# Patient Record
Sex: MALE
[De-identification: names, ages, dates, MRNs, and addresses within clinical notes are randomized per-mention and may not be internally consistent; named-entity substitution may affect disease eponyms.]

---
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
## 2019-01-25 ENCOUNTER — APPOINTMENT (OUTPATIENT)
Dept: PEDIATRICS | Facility: CLINIC | Age: 19
End: 2019-01-25

## 2019-05-01 ENCOUNTER — APPOINTMENT (OUTPATIENT)
Dept: PEDIATRICS | Facility: CLINIC | Age: 19
End: 2019-05-01

## 2019-07-15 ENCOUNTER — APPOINTMENT (OUTPATIENT)
Dept: PEDIATRICS | Facility: CLINIC | Age: 19
End: 2019-07-15
Payer: COMMERCIAL

## 2019-07-15 VITALS — WEIGHT: 195.8 LBS | BODY MASS INDEX: 23.36 KG/M2 | HEART RATE: 69 BPM | HEIGHT: 76.8 IN

## 2019-07-15 DIAGNOSIS — Z00.00 ENCOUNTER FOR GENERAL ADULT MEDICAL EXAMINATION W/OUT ABNORMAL FINDINGS: ICD-10-CM

## 2019-07-15 DIAGNOSIS — L70.0 ACNE VULGARIS: ICD-10-CM

## 2019-07-15 DIAGNOSIS — Z87.2 PERSONAL HISTORY OF DISEASES OF THE SKIN AND SUBCUTANEOUS TISSUE: ICD-10-CM

## 2019-07-15 PROCEDURE — 90620 MENB-4C VACCINE IM: CPT | Mod: SL

## 2019-07-15 PROCEDURE — 96127 BRIEF EMOTIONAL/BEHAV ASSMT: CPT

## 2019-07-15 PROCEDURE — 90471 IMMUNIZATION ADMIN: CPT

## 2019-07-15 PROCEDURE — 99395 PREV VISIT EST AGE 18-39: CPT | Mod: 25

## 2019-07-15 PROCEDURE — 92551 PURE TONE HEARING TEST AIR: CPT

## 2019-07-15 PROCEDURE — 96160 PT-FOCUSED HLTH RISK ASSMT: CPT | Mod: 59

## 2019-07-15 RX ORDER — BENZOYL PEROXIDE 100 MG/ML
10 LIQUID TOPICAL
Qty: 1 | Refills: 4 | Status: ACTIVE | COMMUNITY
Start: 2019-07-15 | End: 1900-01-01

## 2019-07-15 RX ORDER — ERYTHROMYCIN AND BENZOYL PEROXIDE 3 %-5 %
5-3 KIT TOPICAL
Qty: 1 | Refills: 4 | Status: ACTIVE | COMMUNITY
Start: 2019-07-15 | End: 1900-01-01

## 2019-07-16 LAB
ALBUMIN SERPL ELPH-MCNC: 4.9 G/DL
ALP BLD-CCNC: 73 U/L
ALT SERPL-CCNC: 10 U/L
ANION GAP SERPL CALC-SCNC: 14 MMOL/L
AST SERPL-CCNC: 9 U/L
BASOPHILS # BLD AUTO: 0.05 K/UL
BASOPHILS NFR BLD AUTO: 0.9 %
BILIRUB SERPL-MCNC: 0.8 MG/DL
BUN SERPL-MCNC: 10 MG/DL
C TRACH RRNA SPEC QL NAA+PROBE: NOT DETECTED
CALCIUM SERPL-MCNC: 10 MG/DL
CHLORIDE SERPL-SCNC: 105 MMOL/L
CHOLEST SERPL-MCNC: 135 MG/DL
CHOLEST/HDLC SERPL: 2.4 RATIO
CO2 SERPL-SCNC: 24 MMOL/L
CREAT SERPL-MCNC: 0.98 MG/DL
EOSINOPHIL # BLD AUTO: 0.1 K/UL
EOSINOPHIL NFR BLD AUTO: 1.9 %
GLUCOSE SERPL-MCNC: 86 MG/DL
HCT VFR BLD CALC: 48.4 %
HDLC SERPL-MCNC: 57 MG/DL
HGB BLD-MCNC: 15.4 G/DL
HIV1+2 AB SPEC QL IA.RAPID: NONREACTIVE
IMM GRANULOCYTES NFR BLD AUTO: 0.2 %
IRON SATN MFR SERPL: 56 %
IRON SERPL-MCNC: 182 UG/DL
LDLC SERPL CALC-MCNC: 66 MG/DL
LYMPHOCYTES # BLD AUTO: 2.2 K/UL
LYMPHOCYTES NFR BLD AUTO: 41.3 %
MAN DIFF?: NORMAL
MCHC RBC-ENTMCNC: 31.3 PG
MCHC RBC-ENTMCNC: 31.8 GM/DL
MCV RBC AUTO: 98.4 FL
MONOCYTES # BLD AUTO: 0.49 K/UL
MONOCYTES NFR BLD AUTO: 9.2 %
N GONORRHOEA RRNA SPEC QL NAA+PROBE: NOT DETECTED
NEUTROPHILS # BLD AUTO: 2.48 K/UL
NEUTROPHILS NFR BLD AUTO: 46.5 %
PLATELET # BLD AUTO: 287 K/UL
POTASSIUM SERPL-SCNC: 4.8 MMOL/L
PROT SERPL-MCNC: 7.6 G/DL
RBC # BLD: 4.92 M/UL
RBC # FLD: 13.6 %
SODIUM SERPL-SCNC: 142 MMOL/L
SOURCE AMPLIFICATION: NORMAL
TIBC SERPL-MCNC: 325 UG/DL
TRIGL SERPL-MCNC: 60 MG/DL
UIBC SERPL-MCNC: 143 UG/DL
WBC # FLD AUTO: 5.33 K/UL

## 2019-07-16 NOTE — PHYSICAL EXAM
[Alert] : alert [No Acute Distress] : no acute distress [Normocephalic] : normocephalic [EOMI Bilateral] : EOMI bilateral [Clear tympanic membranes with bony landmarks and light reflex present bilaterally] : clear tympanic membranes with bony landmarks and light reflex present bilaterally  [Pink Nasal Mucosa] : pink nasal mucosa [Nonerythematous Oropharynx] : nonerythematous oropharynx [Supple, full passive range of motion] : supple, full passive range of motion [No Palpable Masses] : no palpable masses [Clear to Ausculatation Bilaterally] : clear to auscultation bilaterally [Regular Rate and Rhythm] : regular rate and rhythm [Normal S1, S2 audible] : normal S1, S2 audible [No Murmurs] : no murmurs [+2 Femoral Pulses] : +2 femoral pulses [Soft] : soft [NonTender] : non tender [Non Distended] : non distended [Normoactive Bowel Sounds] : normoactive bowel sounds [No Hepatomegaly] : no hepatomegaly [No Splenomegaly] : no splenomegaly [Timbo: _____] : Timbo [unfilled] [No Abnormal Lymph Nodes Palpated] : no abnormal lymph nodes palpated [Normal Muscle Tone] : normal muscle tone [No Gait Asymmetry] : no gait asymmetry [No pain or deformities with palpation of bone, muscles, joints] : no pain or deformities with palpation of bone, muscles, joints [Straight] : straight [+2 Patella DTR] : +2 patella DTR [Cranial Nerves Grossly Intact] : cranial nerves grossly intact [No Rash or Lesions] : no rash or lesions [FreeTextEntry6] : testes nl desc bilateral. T4.

## 2019-07-16 NOTE — HISTORY OF PRESENT ILLNESS
[FreeTextEntry1] : 18 yr old, in [id number] [employer], wants to be a . 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 \par \par Has a rash on scalp on and off. Uses head and shoulders sometimes. \par Has acne on back, some on face and neck.\par Smokes cigs occ, at [employer]. , JUUL,  ETOH, denies THC, drug abuse. Denies depression, anxiety, suicidal ideation or act.  Is sexually active, females, 3 partners in total, uses Cl. No molestation, abuse, bullying. No cyber bullying.  No cutting, no eating disorder symptoms.\par  [FreeTextEntry6] : entered this today to put in vaccine given but not on well visit note. \par See well visit note for todays visit notes.

## 2019-07-16 NOTE — HISTORY OF PRESENT ILLNESS
[FreeTextEntry1] : 18 yr old, in [place], [place], wants to be a . 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 \par \par Has a rash on scalp on and off. Uses head and shoulders sometimes. \par Has acne on back, some on face and neck.\par Smokes cigs occ, at [employer]. , JUUL,  ETOH, denies THC, drug abuse. Denies depression, anxiety, suicidal ideation or act.  Is sexually active, females, 3 partners in total, uses Cl. No molestation, abuse, bullying. No cyber bullying.  No cutting, no eating disorder symptoms.\par  [FreeTextEntry6] : entered this today to put in vaccine given but not on well visit note. \par See well visit note for todays visit notes.

## 2019-07-16 NOTE — DISCUSSION/SUMMARY
[] : The components of the vaccine(s) to be administered today are listed in the plan of care. The disease(s) for which the vaccine(s) are intended to prevent and the risks have been discussed with the caretaker.  The risks are also included in the appropriate vaccination information statements which have been provided to the patient's caregiver.  The caregiver has given consent to vaccinate. [FreeTextEntry1] : Well 18 yr old\par  Advised least amt video games, interfering with college success.\par Advised no or much less cigs, no juul, disc risk and addictiveness. \par Discussed teen safety issues.\par Dangers of substance abuse.\par Resisting peer pressure. \par Internet, computer precautions, safety. \par \par Always wear a seat belt. Helmet for biking, skiing, scooters.\par \par Safer sex. disc risks, use precautions. \par Pt agrees to HIV test, other STD tests now. \par Venipuncture, fasting, labs sent\par \par Acne med rx given. NKA med or food. \par Agrees to Bexsaro.\par Written: RTO in 2 mos for Bexsaro #2 and Flu vaccine\par Start transition to adult care by 19 years old. \par

## 2019-07-17 LAB — T PALLIDUM AB SER QL IA: NEGATIVE

## 2019-10-28 ENCOUNTER — APPOINTMENT (OUTPATIENT)
Dept: PEDIATRICS | Facility: CLINIC | Age: 19
End: 2019-10-28
Payer: COMMERCIAL

## 2019-10-28 DIAGNOSIS — Z23 ENCOUNTER FOR IMMUNIZATION: ICD-10-CM

## 2019-10-28 PROCEDURE — 90471 IMMUNIZATION ADMIN: CPT

## 2019-10-28 PROCEDURE — 90620 MENB-4C VACCINE IM: CPT

## 2019-10-28 PROCEDURE — 90472 IMMUNIZATION ADMIN EACH ADD: CPT

## 2019-10-28 PROCEDURE — 90686 IIV4 VACC NO PRSV 0.5 ML IM: CPT

## 2019-10-28 PROCEDURE — 99214 OFFICE O/P EST MOD 30 MIN: CPT | Mod: 25

## 2019-10-29 PROBLEM — Z23 IMMUNIZATION DUE: Status: ACTIVE | Noted: 2019-10-29

## 2019-10-29 NOTE — HISTORY OF PRESENT ILLNESS
[FreeTextEntry6] : 1.Had a cold 2 weeks ago, still with a rare cough few times a day, no asthma hx, no allergies. \par At the time dentist said his tonsils were inflamed. No sorethroat now. \par 2.- Questions about best contraceptive for him and girlfriend, who has ITP and cannot use hormones. Using C. Total 4 partners. No concerns about STD, was tested last labs, does not want tests now. \par 3.- Has a "pimple " that squeezed pus out of this am on L arm. Wants vaccines on LA> No fever or pain there. \par 4. Needs vaccines.

## 2019-10-29 NOTE — DISCUSSION/SUMMARY
[FreeTextEntry1] : 1. Mild rare cough, PE nl, not a concern. No hx asthma. \par 2. Disc BC options, girlfriend should see GYN ASAP, has appt in one mos, try to move it up for good protection. Continue C use. \par 3. Small Abscess L upper medial arm, localized under pimple, non fluctuant, no pus drainable. \par P- warm soaks bid, if pus drain it out. Explained importance of returning immediately if gets bigger, feels hot, painful, or fever. \par 4. Bexsero #2 and Flu vaccine given LA lateral, not near the pimple/abscess.  [] : The components of the vaccine(s) to be administered today are listed in the plan of care. The disease(s) for which the vaccine(s) are intended to prevent and the risks have been discussed with the caretaker.  The risks are also included in the appropriate vaccination information statements which have been provided to the patient's caregiver.  The caregiver has given consent to vaccinate.

## 2019-10-29 NOTE — PHYSICAL EXAM
[NL] : warm [de-identified] : tonsils now normal, small, non inflamed, no redness.  [de-identified] : adonis parrish [FreeTextEntry7] : clear [de-identified] : R upper arm - small abscess 1 cm under pimple medial.

## 2020-04-27 ENCOUNTER — APPOINTMENT (OUTPATIENT)
Dept: PEDIATRICS | Facility: CLINIC | Age: 20
End: 2020-04-27
Payer: COMMERCIAL

## 2020-04-27 ENCOUNTER — TRANSCRIPTION ENCOUNTER (OUTPATIENT)
Age: 20
End: 2020-04-27

## 2020-04-27 DIAGNOSIS — L30.9 DERMATITIS, UNSPECIFIED: ICD-10-CM

## 2020-04-27 DIAGNOSIS — L21.0 SEBORRHEA CAPITIS: ICD-10-CM

## 2020-04-27 DIAGNOSIS — L02.414 CUTANEOUS ABSCESS OF LEFT UPPER LIMB: ICD-10-CM

## 2020-04-27 PROCEDURE — 99213 OFFICE O/P EST LOW 20 MIN: CPT | Mod: 95

## 2020-04-27 RX ORDER — HYDROCORTISONE 25 MG/G
2.5 OINTMENT TOPICAL
Qty: 1 | Refills: 3 | Status: ACTIVE | COMMUNITY
Start: 2020-04-27 | End: 1900-01-01

## 2020-04-27 NOTE — HISTORY OF PRESENT ILLNESS
[Home] : at home, [unfilled] , at the time of the visit. [Medical Office: ([facility])___] : at the medical office located in  [Mother] : mother [Patient] : the patient [FreeTextEntry6] : dry areas around both eyes for 1-2 weeks, no eye discharge, itchy\par dandruff on scalp and some itchy areas [de-identified] : skin issues

## 2020-04-27 NOTE — PHYSICAL EXAM
[Moves All Extremities x 4] : moves all extremities x4 [NL] : normotonic [FreeTextEntry7] : breathing comfortably [FreeTextEntry5] : dry areas b/l on upper and lower lids, left appears worse, no erythema, no conjunctival injection or eye discharge [de-identified] : scattered dry areas on scalp, no erythema, not round or elevated

## 2020-04-27 NOTE — DISCUSSION/SUMMARY
[FreeTextEntry1] : 20 yo with dry patches around eyes, probable allergic\par claritin daily\par dandruff, nizoral shampoo 2-3 times/week\par hydrocortisone to areas that are irritated and pruritic\par to dermatology if symptoms continue

## 2020-11-22 ENCOUNTER — APPOINTMENT (OUTPATIENT)
Dept: PEDIATRICS | Facility: CLINIC | Age: 20
End: 2020-11-22
Payer: COMMERCIAL

## 2020-11-22 DIAGNOSIS — H66.93 OTITIS MEDIA, UNSPECIFIED, BILATERAL: ICD-10-CM

## 2020-11-22 DIAGNOSIS — R21 RASH AND OTHER NONSPECIFIC SKIN ERUPTION: ICD-10-CM

## 2020-11-22 DIAGNOSIS — L01.00 IMPETIGO, UNSPECIFIED: ICD-10-CM

## 2020-11-22 DIAGNOSIS — Z30.09 ENCOUNTER FOR OTHER GENERAL COUNSELING AND ADVICE ON CONTRACEPTION: ICD-10-CM

## 2020-11-22 PROCEDURE — 99214 OFFICE O/P EST MOD 30 MIN: CPT | Mod: 95

## 2020-11-22 RX ORDER — MUPIROCIN 20 MG/G
2 OINTMENT TOPICAL
Qty: 1 | Refills: 4 | Status: ACTIVE | COMMUNITY
Start: 2020-11-22 | End: 1900-01-01

## 2020-11-22 NOTE — DISCUSSION/SUMMARY
[FreeTextEntry1] : Rashes:\par 1. Poison ivy on arm, and the few red linear areas on body. \par Arm with many blisters, now yellow tint, not crusting. Possible bacterial superinfection, if present mild. \par Use mupirocin ointment tid. \par Discussed oral prednisone but he is improving and sx ok, so not needed now. \par \par 2. Penis lesions non specific, but concerning to pt. \par \par Referred to derm. \par RTO flu vaccine

## 2020-11-22 NOTE — HISTORY OF PRESENT ILLNESS
[Home] : at home, [unfilled] , at the time of the visit. [Medical Office: ([facility])___] : at the medical office located in  [Verbal consent obtained from patient] : the patient, [unfilled] [FreeTextEntry6] : TH\par Remote learning. \par \par 1. Girlfriend with confirmed molluscum contagiousum on skin, treated by derm now. \par He has some bumps on penis, thinks may be molluscum. Says uses BC. \par \par 2. Went to woods 1 week ago, blistering large rash Left forearm, few red linear lines on                                                                        \par Using HC cream, better than last week. \par Arm area now with yellow color. No fever. \par Itching but not severe. Arm more sore than itching now.

## 2020-11-22 NOTE — PHYSICAL EXAM
[FreeTextEntry1] : video NAD [de-identified] : 1. Many confluent blisters on L forearm, large area. Has a yellow tint. No redness or cellulitis below. 2. Penis - many small flesh colored tiny papules at base, rest nl.

## 2020-11-25 ENCOUNTER — APPOINTMENT (OUTPATIENT)
Dept: PEDIATRICS | Facility: CLINIC | Age: 20
End: 2020-11-25
Payer: COMMERCIAL

## 2020-11-25 DIAGNOSIS — L23.7 ALLERGIC CONTACT DERMATITIS DUE TO PLANTS, EXCEPT FOOD: ICD-10-CM

## 2020-11-25 PROCEDURE — 99442: CPT

## 2020-11-25 RX ORDER — METHYLPREDNISOLONE 4 MG/1
4 TABLET ORAL
Qty: 1 | Refills: 0 | Status: ACTIVE | COMMUNITY
Start: 2020-11-25 | End: 1900-01-01

## 2020-11-25 NOTE — DISCUSSION/SUMMARY
[FreeTextEntry1] : Sent in rx, can use if John wants it. \par Optional at this time. RB discussed. \par

## 2020-11-25 NOTE — HISTORY OF PRESENT ILLNESS
[Home] : at home, [unfilled] , at the time of the visit. [Medical Office: ([facility])___] : at the medical office located in  [Verbal consent obtained from patient] : the patient, [unfilled] [FreeTextEntry6] : John says his mother is concerned his rash is spreading down  on the arm, wants steroids for him. \par John thinks it is the same. Less yellow and crusting now that he is using Mupriocin ointment. No redness pain or fever. \par Did not call derm. \par \par (Penis still with 2 small white spots like pimples). \par \par

## 2021-05-07 ENCOUNTER — TRANSCRIPTION ENCOUNTER (OUTPATIENT)
Age: 21
End: 2021-05-07